# Patient Record
Sex: MALE | Race: WHITE | NOT HISPANIC OR LATINO | Employment: FULL TIME | URBAN - NONMETROPOLITAN AREA
[De-identification: names, ages, dates, MRNs, and addresses within clinical notes are randomized per-mention and may not be internally consistent; named-entity substitution may affect disease eponyms.]

---

## 2019-09-13 ENCOUNTER — CONSULT (OUTPATIENT)
Dept: CARDIOLOGY | Facility: CLINIC | Age: 39
End: 2019-09-13

## 2019-09-13 VITALS
WEIGHT: 215 LBS | SYSTOLIC BLOOD PRESSURE: 134 MMHG | DIASTOLIC BLOOD PRESSURE: 100 MMHG | HEART RATE: 71 BPM | HEIGHT: 69 IN | BODY MASS INDEX: 31.84 KG/M2

## 2019-09-13 DIAGNOSIS — R07.9 CHEST PAIN, UNSPECIFIED TYPE: Primary | ICD-10-CM

## 2019-09-13 PROBLEM — Z82.49 FAMILY HISTORY OF EARLY CAD: Status: RESOLVED | Noted: 2019-09-13 | Resolved: 2019-09-13

## 2019-09-13 PROBLEM — Z82.49 FAMILY HISTORY OF EARLY CAD: Status: ACTIVE | Noted: 2019-09-13

## 2019-09-13 PROBLEM — I10 ESSENTIAL HYPERTENSION: Status: ACTIVE | Noted: 2019-09-13

## 2019-09-13 PROCEDURE — 99244 OFF/OP CNSLTJ NEW/EST MOD 40: CPT | Performed by: INTERNAL MEDICINE

## 2019-09-13 PROCEDURE — 93000 ELECTROCARDIOGRAM COMPLETE: CPT | Performed by: INTERNAL MEDICINE

## 2019-09-13 RX ORDER — ATENOLOL 25 MG/1
25 TABLET ORAL DAILY
COMMUNITY

## 2019-09-13 NOTE — PROGRESS NOTES
[facility]  Encounter Date:09/13/2019    Patient ID: Laci Richards is a 39 y.o. male who resides in [place], [place].    CC/Reason for visit:    · Chest pain         Problem List Items Addressed This Visit     Chest pain - Primary    Overview     · Atypical chest pain symptoms, 9/2019  · Normal EKG, 9/13/2019         Current Assessment & Plan     · Low pretest probability of obstructive CAD based on characteristics of chest pain, age, and one risk factor (HTN)  · Obtain great exercise stress test         Relevant Orders    ECG 12 Lead    Treadmill Stress Test    Lipid Panel    Comprehensive Metabolic Panel    High Sensitivity CRP               · Graded exercise stress test  · Labs today to include lipid profile, CMP, and high-sensitivity CRP  Follow-up after test          Laci Richards is referred to my office by KAILEY Dawson, for evaluation of chest pain.  The patient is a 39-year-old gentleman with cardiac risk factor including hypertension.  Approximately 2 weeks ago, the patient was experiencing sharp chest pain symptoms located in the center of his chest and left chest wall.  These chest pains lasted on and off for approximately 7 days.  They were not exacerbated by exertion or relieved with rest.  In fact, the chest pains improved when he became active.  The patient did not report any symptoms of upper respiratory tract infection at that time.  The chest pains have not occurred over the last week.  When he was in to see his PCP at that time, an EKG was performed and there was concern for T wave inversion in lead III.  The patient reports good exercise capacity and no recent changes in his physical activities.    Family history is notable for his father who had a myocardial infarction at age 60.  The patient does not use tobacco.  His lipid status is unknown.    Review of Systems   Constitution: Positive for malaise/fatigue. Negative for weakness.   Eyes: Negative  "for vision loss in left eye and vision loss in right eye.   Cardiovascular: Positive for chest pain and leg swelling. Negative for dyspnea on exertion, near-syncope, orthopnea, palpitations, paroxysmal nocturnal dyspnea and syncope.   Musculoskeletal: Negative for myalgias.   Neurological: Negative for brief paralysis, excessive daytime sleepiness, focal weakness, numbness and paresthesias.   All other systems reviewed and are negative.      The patient's past medical, social, family history and ROS reviewed in the patient's electronic medical record.    Allergies  Patient has no known allergies.    Outpatient Medications Marked as Taking for the 9/13/19 encounter (Consult) with Domingo Ball IV, MD   Medication Sig Dispense Refill   • atenolol (TENORMIN) 25 MG tablet Take 25 mg by mouth Daily.           Past Medical History:   Diagnosis Date   • Hypertension      Past Surgical History:   Procedure Laterality Date   • APPENDECTOMY       Family History   Problem Relation Age of Onset   • Hypertension Mother    • Heart disease Father    • Heart attack Father      Social History     Tobacco Use   • Smoking status: Never Smoker   • Smokeless tobacco: Current User     Types: Chew   • Tobacco comment: 1-2 Cans of Dip for 17+ years   Substance Use Topics   • Alcohol use: No     Frequency: Never           Blood pressure 134/100, pulse 71, height 175.3 cm (69\"), weight 97.5 kg (215 lb).  Body mass index is 31.75 kg/m².  Vitals:    09/13/19 1107   Patient Position: Sitting       Physical Exam   Constitutional: He is oriented to person, place, and time. He appears well-developed and well-nourished.   HENT:   Head: Normocephalic and atraumatic.   Eyes: Pupils are equal, round, and reactive to light. No scleral icterus.   Neck: No JVD present. Carotid bruit is not present. No thyromegaly present.   Cardiovascular: Normal rate and regular rhythm. Exam reveals no gallop.   No murmur heard.  Pulmonary/Chest: Effort " normal and breath sounds normal.   Abdominal: Soft. He exhibits no distension. There is no hepatosplenomegaly.   Musculoskeletal: He exhibits no edema.   Neurological: He is alert and oriented to person, place, and time.   Skin: Skin is warm and dry.   Psychiatric: He has a normal mood and affect. His behavior is normal.       Data Review:       ECG 12 Lead  Date/Time: 9/13/2019 11:24 AM  Performed by: Domingo Ball IV, MD  Authorized by: Domingo Ball IV, MD   Comparison: compared with previous ECG from 9/6/2019  Similar to previous ECG  Rhythm: sinus rhythm    Clinical impression: normal ECG            EKG reviewed from External Medical Records, 09/08/2019:        MIGUEL Ball MD [facility], [facility]  Interventional and General Cardiology

## 2019-09-13 NOTE — ASSESSMENT & PLAN NOTE
· Low pretest probability of obstructive CAD based on characteristics of chest pain, age, and one risk factor (HTN)  · Obtain great exercise stress test

## 2019-09-14 LAB
ALBUMIN SERPL-MCNC: 5.4 G/DL (ref 3.5–5.2)
ALBUMIN/GLOB SERPL: 2.8 G/DL
ALP SERPL-CCNC: 54 U/L (ref 39–117)
ALT SERPL-CCNC: 109 U/L (ref 1–41)
AST SERPL-CCNC: 49 U/L (ref 1–40)
BILIRUB SERPL-MCNC: 0.6 MG/DL (ref 0.2–1.2)
BUN SERPL-MCNC: 16 MG/DL (ref 6–20)
BUN/CREAT SERPL: 19 (ref 7–25)
CALCIUM SERPL-MCNC: 9.9 MG/DL (ref 8.6–10.5)
CHLORIDE SERPL-SCNC: 100 MMOL/L (ref 98–107)
CHOLEST SERPL-MCNC: 151 MG/DL (ref 0–200)
CO2 SERPL-SCNC: 24.7 MMOL/L (ref 22–29)
CREAT SERPL-MCNC: 0.84 MG/DL (ref 0.76–1.27)
CRP SERPL HS-MCNC: 1.41 MG/L (ref 0–3)
GLOBULIN SER CALC-MCNC: 1.9 GM/DL
GLUCOSE SERPL-MCNC: 81 MG/DL (ref 65–99)
HDLC SERPL-MCNC: 41 MG/DL (ref 40–60)
LDLC SERPL CALC-MCNC: 89 MG/DL (ref 0–100)
POTASSIUM SERPL-SCNC: 4.4 MMOL/L (ref 3.5–5.2)
PROT SERPL-MCNC: 7.3 G/DL (ref 6–8.5)
SODIUM SERPL-SCNC: 140 MMOL/L (ref 136–145)
TRIGL SERPL-MCNC: 103 MG/DL (ref 0–150)
VLDLC SERPL CALC-MCNC: 20.6 MG/DL

## 2019-09-16 NOTE — PROGRESS NOTES
Let him know his labs look fine with exception of mild liver enzyme elevation.  PCP should recheck in 3-6 months.

## 2019-09-24 ENCOUNTER — TELEPHONE (OUTPATIENT)
Dept: CARDIOLOGY | Facility: CLINIC | Age: 39
End: 2019-09-24

## 2019-09-24 NOTE — TELEPHONE ENCOUNTER
From scheduling-attempted to contact this patient 3 times regarding his testing and have been unable to reach him with the phone numbers provided.  A letter requesting a call from the patient to schedule his testing has been mailed.